# Patient Record
Sex: MALE | Race: OTHER | NOT HISPANIC OR LATINO
[De-identification: names, ages, dates, MRNs, and addresses within clinical notes are randomized per-mention and may not be internally consistent; named-entity substitution may affect disease eponyms.]

---

## 2017-04-27 ENCOUNTER — APPOINTMENT (OUTPATIENT)
Dept: PHYSICAL MEDICINE AND REHAB | Facility: CLINIC | Age: 72
End: 2017-04-27

## 2017-04-27 VITALS
SYSTOLIC BLOOD PRESSURE: 156 MMHG | HEIGHT: 70 IN | HEART RATE: 74 BPM | BODY MASS INDEX: 26.92 KG/M2 | WEIGHT: 188 LBS | DIASTOLIC BLOOD PRESSURE: 72 MMHG

## 2017-05-22 ENCOUNTER — APPOINTMENT (OUTPATIENT)
Dept: ORTHOPEDIC SURGERY | Facility: CLINIC | Age: 72
End: 2017-05-22

## 2017-05-22 VITALS
HEIGHT: 70 IN | WEIGHT: 186 LBS | DIASTOLIC BLOOD PRESSURE: 69 MMHG | HEART RATE: 81 BPM | SYSTOLIC BLOOD PRESSURE: 112 MMHG | BODY MASS INDEX: 26.63 KG/M2

## 2017-05-22 DIAGNOSIS — Z80.51 FAMILY HISTORY OF MALIGNANT NEOPLASM OF KIDNEY: ICD-10-CM

## 2017-05-22 DIAGNOSIS — Z82.61 FAMILY HISTORY OF ARTHRITIS: ICD-10-CM

## 2017-05-22 DIAGNOSIS — Z87.891 PERSONAL HISTORY OF NICOTINE DEPENDENCE: ICD-10-CM

## 2017-05-22 DIAGNOSIS — F17.200 NICOTINE DEPENDENCE, UNSPECIFIED, UNCOMPLICATED: ICD-10-CM

## 2017-05-22 RX ORDER — ACETYLCYSTEINE 600 MG
CAPSULE ORAL
Refills: 0 | Status: ACTIVE | COMMUNITY

## 2017-05-22 RX ORDER — HYDROCHLOROTHIAZIDE 25 MG/1
25 TABLET ORAL
Refills: 0 | Status: ACTIVE | COMMUNITY

## 2017-06-07 ENCOUNTER — APPOINTMENT (OUTPATIENT)
Dept: PHYSICAL MEDICINE AND REHAB | Facility: CLINIC | Age: 72
End: 2017-06-07

## 2017-06-07 VITALS — WEIGHT: 190 LBS | BODY MASS INDEX: 27.2 KG/M2 | HEIGHT: 70 IN

## 2017-06-07 VITALS
HEART RATE: 61 BPM | DIASTOLIC BLOOD PRESSURE: 73 MMHG | OXYGEN SATURATION: 96 % | TEMPERATURE: 97.8 F | SYSTOLIC BLOOD PRESSURE: 138 MMHG

## 2017-06-07 DIAGNOSIS — N40.0 BENIGN PROSTATIC HYPERPLASIA WITHOUT LOWER URINARY TRACT SYMPMS: ICD-10-CM

## 2017-06-08 PROBLEM — N40.0 ENLARGED PROSTATE: Status: RESOLVED | Noted: 2017-04-27 | Resolved: 2017-06-08

## 2017-07-24 ENCOUNTER — RX RENEWAL (OUTPATIENT)
Age: 72
End: 2017-07-24

## 2017-12-11 ENCOUNTER — OUTPATIENT (OUTPATIENT)
Dept: OUTPATIENT SERVICES | Facility: HOSPITAL | Age: 72
LOS: 1 days | End: 2017-12-11
Payer: COMMERCIAL

## 2017-12-11 VITALS
OXYGEN SATURATION: 97 % | DIASTOLIC BLOOD PRESSURE: 77 MMHG | RESPIRATION RATE: 16 BRPM | SYSTOLIC BLOOD PRESSURE: 163 MMHG | HEIGHT: 71 IN | HEART RATE: 58 BPM | WEIGHT: 179.9 LBS | TEMPERATURE: 98 F

## 2017-12-11 DIAGNOSIS — R94.39 ABNORMAL RESULT OF OTHER CARDIOVASCULAR FUNCTION STUDY: ICD-10-CM

## 2017-12-11 DIAGNOSIS — Z98.890 OTHER SPECIFIED POSTPROCEDURAL STATES: Chronic | ICD-10-CM

## 2017-12-11 LAB
ALBUMIN SERPL ELPH-MCNC: 3.9 G/DL — SIGNIFICANT CHANGE UP (ref 3.3–5)
ALP SERPL-CCNC: 72 U/L — SIGNIFICANT CHANGE UP (ref 40–120)
ALT FLD-CCNC: 17 U/L RC — SIGNIFICANT CHANGE UP (ref 10–45)
ANION GAP SERPL CALC-SCNC: 11 MMOL/L — SIGNIFICANT CHANGE UP (ref 5–17)
AST SERPL-CCNC: 24 U/L — SIGNIFICANT CHANGE UP (ref 10–40)
BILIRUB SERPL-MCNC: 0.6 MG/DL — SIGNIFICANT CHANGE UP (ref 0.2–1.2)
BUN SERPL-MCNC: 16 MG/DL — SIGNIFICANT CHANGE UP (ref 7–23)
CALCIUM SERPL-MCNC: 9 MG/DL — SIGNIFICANT CHANGE UP (ref 8.4–10.5)
CHLORIDE SERPL-SCNC: 105 MMOL/L — SIGNIFICANT CHANGE UP (ref 96–108)
CO2 SERPL-SCNC: 27 MMOL/L — SIGNIFICANT CHANGE UP (ref 22–31)
CREAT SERPL-MCNC: 0.96 MG/DL — SIGNIFICANT CHANGE UP (ref 0.5–1.3)
GLUCOSE SERPL-MCNC: 123 MG/DL — HIGH (ref 70–99)
HCT VFR BLD CALC: 37.4 % — LOW (ref 39–50)
HGB BLD-MCNC: 12.8 G/DL — LOW (ref 13–17)
MCHC RBC-ENTMCNC: 31.4 PG — SIGNIFICANT CHANGE UP (ref 27–34)
MCHC RBC-ENTMCNC: 34.2 GM/DL — SIGNIFICANT CHANGE UP (ref 32–36)
MCV RBC AUTO: 91.9 FL — SIGNIFICANT CHANGE UP (ref 80–100)
PLATELET # BLD AUTO: 266 K/UL — SIGNIFICANT CHANGE UP (ref 150–400)
POTASSIUM SERPL-MCNC: 4.1 MMOL/L — SIGNIFICANT CHANGE UP (ref 3.5–5.3)
POTASSIUM SERPL-SCNC: 4.1 MMOL/L — SIGNIFICANT CHANGE UP (ref 3.5–5.3)
PROT SERPL-MCNC: 6.9 G/DL — SIGNIFICANT CHANGE UP (ref 6–8.3)
RBC # BLD: 4.07 M/UL — LOW (ref 4.2–5.8)
RBC # FLD: 11.4 % — SIGNIFICANT CHANGE UP (ref 10.3–14.5)
SODIUM SERPL-SCNC: 143 MMOL/L — SIGNIFICANT CHANGE UP (ref 135–145)
WBC # BLD: 6.7 K/UL — SIGNIFICANT CHANGE UP (ref 3.8–10.5)
WBC # FLD AUTO: 6.7 K/UL — SIGNIFICANT CHANGE UP (ref 3.8–10.5)

## 2017-12-11 PROCEDURE — 93458 L HRT ARTERY/VENTRICLE ANGIO: CPT

## 2017-12-11 PROCEDURE — 85027 COMPLETE CBC AUTOMATED: CPT

## 2017-12-11 PROCEDURE — 93458 L HRT ARTERY/VENTRICLE ANGIO: CPT | Mod: 26

## 2017-12-11 PROCEDURE — C1769: CPT

## 2017-12-11 PROCEDURE — C1887: CPT

## 2017-12-11 PROCEDURE — C1894: CPT

## 2017-12-11 PROCEDURE — 80053 COMPREHEN METABOLIC PANEL: CPT

## 2017-12-11 RX ORDER — TAMSULOSIN HYDROCHLORIDE 0.4 MG/1
1 CAPSULE ORAL
Qty: 0 | Refills: 0 | COMMUNITY

## 2017-12-11 RX ORDER — MULTIVIT-MIN/FERROUS GLUCONATE 9 MG/15 ML
1 LIQUID (ML) ORAL
Qty: 0 | Refills: 0 | COMMUNITY

## 2017-12-11 RX ORDER — UBIDECARENONE 100 MG
1 CAPSULE ORAL
Qty: 0 | Refills: 0 | COMMUNITY

## 2017-12-11 RX ORDER — SODIUM CHLORIDE 9 MG/ML
3 INJECTION INTRAMUSCULAR; INTRAVENOUS; SUBCUTANEOUS EVERY 8 HOURS
Qty: 0 | Refills: 0 | Status: DISCONTINUED | OUTPATIENT
Start: 2017-12-11 | End: 2017-12-26

## 2017-12-11 RX ORDER — RANOLAZINE 500 MG/1
500 TABLET, FILM COATED, EXTENDED RELEASE ORAL ONCE
Qty: 0 | Refills: 0 | Status: COMPLETED | OUTPATIENT
Start: 2017-12-11 | End: 2017-12-11

## 2017-12-11 RX ORDER — ASPIRIN/CALCIUM CARB/MAGNESIUM 324 MG
1 TABLET ORAL
Qty: 0 | Refills: 0 | COMMUNITY

## 2017-12-11 RX ORDER — CHOLECALCIFEROL (VITAMIN D3) 125 MCG
1 CAPSULE ORAL
Qty: 0 | Refills: 0 | COMMUNITY

## 2017-12-11 RX ORDER — PERINDOPRIL ERBUMINE 8 MG/1
1 TABLET ORAL
Qty: 0 | Refills: 0 | COMMUNITY

## 2017-12-11 RX ORDER — GLUCOSAMINE HCL/CHONDROITIN SU 500-400 MG
1 CAPSULE ORAL
Qty: 0 | Refills: 0 | COMMUNITY

## 2017-12-11 RX ORDER — ATENOLOL 25 MG/1
2 TABLET ORAL
Qty: 0 | Refills: 0 | COMMUNITY

## 2017-12-11 RX ORDER — ROSUVASTATIN CALCIUM 5 MG/1
1 TABLET ORAL
Qty: 0 | Refills: 0 | COMMUNITY

## 2017-12-11 RX ADMIN — RANOLAZINE 500 MILLIGRAM(S): 500 TABLET, FILM COATED, EXTENDED RELEASE ORAL at 11:12

## 2017-12-11 NOTE — H&P CARDIOLOGY - HISTORY OF PRESENT ILLNESS
72 year old Afghani male with PMH of former smoker, htn, hld, presents today for cardiac catheterization. Patient reports his old cardiologist  so went to new cardiologist who ordered NST revealing   Patient denies CP or DE JESUS. 72 year old Afghani male with PMH of former smoker, htn, hld, presents today for cardiac catheterization. Patient reports his PCP/cardiologist passed away so went to new cardiologist (Dr Sheik Downey)  who ordered NST revealing abnormality (report not available). Patient denies CP or DE JESUS.   On BB at home. Ranexa added for maximal anti anginal support

## 2022-08-11 ENCOUNTER — NON-APPOINTMENT (OUTPATIENT)
Age: 77
End: 2022-08-11

## 2022-08-21 ENCOUNTER — NON-APPOINTMENT (OUTPATIENT)
Age: 77
End: 2022-08-21

## 2022-12-06 ENCOUNTER — OFFICE (OUTPATIENT)
Dept: URBAN - METROPOLITAN AREA CLINIC 109 | Facility: CLINIC | Age: 77
Setting detail: OPHTHALMOLOGY
End: 2022-12-06
Payer: COMMERCIAL

## 2022-12-06 DIAGNOSIS — H26.491: ICD-10-CM

## 2022-12-06 DIAGNOSIS — H35.3111: ICD-10-CM

## 2022-12-06 DIAGNOSIS — H35.3121: ICD-10-CM

## 2022-12-06 PROBLEM — I65.23 OCCLUSION AND STENOSIS OF BOTH CAROTID ARTERIES: Status: ACTIVE | Noted: 2022-12-06

## 2022-12-06 PROCEDURE — 92134 CPTRZ OPH DX IMG PST SGM RTA: CPT | Performed by: OPHTHALMOLOGY

## 2022-12-06 PROCEDURE — 92014 COMPRE OPH EXAM EST PT 1/>: CPT | Performed by: OPHTHALMOLOGY

## 2022-12-06 ASSESSMENT — REFRACTION_CURRENTRX
OS_SPHERE: +1.50
OS_AXIS: 68
OD_OVR_VA: 20/
OS_OVR_VA: 20/
OS_CYLINDER: -1.00
OD_SPHERE: +2.00
OD_AXIS: 92
OD_ADD: +2.75
OS_ADD: +2.75
OD_CYLINDER: -0.75

## 2022-12-06 ASSESSMENT — KERATOMETRY
OD_K1POWER_DIOPTERS: 44.00
OS_K2POWER_DIOPTERS: 45.25
OD_K2POWER_DIOPTERS: 44.87
OS_K1POWER_DIOPTERS: 44.00
OD_AXISANGLE_DEGREES: 172
OS_AXISANGLE_DEGREES: 1

## 2022-12-06 ASSESSMENT — SPHEQUIV_DERIVED
OD_SPHEQUIV: 0.5
OS_SPHEQUIV: -0.125

## 2022-12-06 ASSESSMENT — REFRACTION_AUTOREFRACTION
OD_AXIS: 109
OS_CYLINDER: -1.75
OS_AXIS: 070
OD_CYLINDER: -1.50
OD_SPHERE: +1.25
OS_SPHERE: +0.75

## 2022-12-06 ASSESSMENT — VISUAL ACUITY
OD_BCVA: 20/20-2
OS_BCVA: 20/20-2

## 2022-12-06 ASSESSMENT — AXIALLENGTH_DERIVED
OS_AL: 23.2329
OD_AL: 23.0657

## 2022-12-06 ASSESSMENT — TONOMETRY
OS_IOP_MMHG: 16
OD_IOP_MMHG: 10

## 2022-12-06 ASSESSMENT — PACHYMETRY
OS_CT_CORRECTION: 1
OS_CT_UM: 537

## 2022-12-06 ASSESSMENT — REFRACTION_MANIFEST
OS_ADD: +2.50
OD_ADD: +2.50

## 2023-06-02 PROBLEM — I10 ESSENTIAL (PRIMARY) HYPERTENSION: Chronic | Status: ACTIVE | Noted: 2017-12-11

## 2023-06-02 PROBLEM — Z87.891 PERSONAL HISTORY OF NICOTINE DEPENDENCE: Chronic | Status: ACTIVE | Noted: 2017-12-11

## 2023-06-02 PROBLEM — E78.5 HYPERLIPIDEMIA, UNSPECIFIED: Chronic | Status: ACTIVE | Noted: 2017-12-11

## 2023-06-05 ENCOUNTER — APPOINTMENT (OUTPATIENT)
Dept: ORTHOPEDIC SURGERY | Facility: CLINIC | Age: 78
End: 2023-06-05
Payer: COMMERCIAL

## 2023-06-05 PROCEDURE — 72110 X-RAY EXAM L-2 SPINE 4/>VWS: CPT

## 2023-06-05 PROCEDURE — 99204 OFFICE O/P NEW MOD 45 MIN: CPT

## 2023-06-05 NOTE — HISTORY OF PRESENT ILLNESS
[Lower back] : lower back [Gradual] : gradual [Sudden] : sudden [7] : 7 [6] : 6 [Burning] : burning [Shooting] : shooting [Stabbing] : stabbing [Intermittent] : intermittent [Rest] : rest [Exercising] : exercising [Retired] : Work status: retired [de-identified] : Patient presents today with complaints of low back pain that radiates to RLE (lateral calf) x 2 weeks. Patient reports symptoms worsen with activity but remains active. Patient denies precipitating event/acute injury. Patient denies n/t.  Patient reports these symptoms have been present since 2010 in which he underwent Lumbar laminectomy. His symptoms have returned and he reports having persistent loss of balance. Over the years, he has completed course of PT.   States major issue with balance as well even when standing still. 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   L4-5 posterior surgical changes with grade 1 anterolisthesis, circumferential disc bulge and facet arthropathy contributing to severe central canal and bilateral neural foraminal stenosis. These findings are stable.\par \par 2.  Additional findings at this level include fairly extensive marrow edema along the endplates at L4-L5 with mild signal hyperintensity involving the intervening L4-5 disc. Differential diagnosis includes advanced degenerative change versus discitis osteomyelitis. Clinical correlation suggested with ESR. Contrast may be helpful in the complete evaluation this patient.\par \par Lumbar Xrays 4V completed today:\par L4/5, L5/S1 disc degeneration [] : Post Surgical Visit: no [FreeTextEntry1] : l spine  [FreeTextEntry5] : Pt has a hx of spinal stenosis, pt is having a sharp pain on the right side of his lower back as well as numbness and tingling and loss of balance in his right leg for the past few months and has seen outside drs, pt had an mri done and was told to follow up with us for further eval, pt has had L4-L5 surgery in 2010 but does not remember what the procedure was   [de-identified] : none  [de-identified] : 5+ years ago

## 2023-06-05 NOTE — ASSESSMENT
[FreeTextEntry1] : 77 M with known history of lumbar spinal stenosis and spondylolisthesis with 2 weeks of RLE radic and 5 years of balance issues. \par posiutive hoffmans\par never worked up for myelopathy\par MRI C/T/L spine\par FU after MRI\par MDP

## 2023-06-14 ENCOUNTER — FORM ENCOUNTER (OUTPATIENT)
Age: 78
End: 2023-06-14

## 2023-06-15 ENCOUNTER — APPOINTMENT (OUTPATIENT)
Dept: MRI IMAGING | Facility: CLINIC | Age: 78
End: 2023-06-15
Payer: COMMERCIAL

## 2023-06-15 PROCEDURE — 72148 MRI LUMBAR SPINE W/O DYE: CPT

## 2023-06-15 PROCEDURE — 72141 MRI NECK SPINE W/O DYE: CPT

## 2023-06-18 ENCOUNTER — FORM ENCOUNTER (OUTPATIENT)
Age: 78
End: 2023-06-18

## 2023-06-19 ENCOUNTER — APPOINTMENT (OUTPATIENT)
Dept: MRI IMAGING | Facility: CLINIC | Age: 78
End: 2023-06-19
Payer: COMMERCIAL

## 2023-06-19 ENCOUNTER — FORM ENCOUNTER (OUTPATIENT)
Age: 78
End: 2023-06-19

## 2023-06-19 PROCEDURE — 70553 MRI BRAIN STEM W/O & W/DYE: CPT

## 2023-06-20 ENCOUNTER — APPOINTMENT (OUTPATIENT)
Dept: MRI IMAGING | Facility: CLINIC | Age: 78
End: 2023-06-20
Payer: COMMERCIAL

## 2023-06-20 PROCEDURE — 72146 MRI CHEST SPINE W/O DYE: CPT

## 2023-06-21 ENCOUNTER — TRANSCRIPTION ENCOUNTER (OUTPATIENT)
Age: 78
End: 2023-06-21

## 2023-06-22 ENCOUNTER — NON-APPOINTMENT (OUTPATIENT)
Age: 78
End: 2023-06-22

## 2023-06-26 ENCOUNTER — APPOINTMENT (OUTPATIENT)
Dept: ORTHOPEDIC SURGERY | Facility: CLINIC | Age: 78
End: 2023-06-26
Payer: COMMERCIAL

## 2023-06-26 VITALS — HEIGHT: 70 IN | WEIGHT: 190 LBS | BODY MASS INDEX: 27.2 KG/M2

## 2023-06-26 PROCEDURE — 99213 OFFICE O/P EST LOW 20 MIN: CPT

## 2023-06-26 NOTE — HISTORY OF PRESENT ILLNESS
[Neck] : neck [Mid-back] : mid-back [Lower back] : lower back [Gradual] : gradual [Sudden] : sudden [7] : 7 [6] : 6 [Burning] : burning [Shooting] : shooting [Stabbing] : stabbing [Intermittent] : intermittent [Rest] : rest [Exercising] : exercising [Retired] : Work status: retired [de-identified] : 6/26/23: MRI results .  Still wit shuffling gait.  Issues with ambulating. No fine motor movement or dexterity issues. NO pain radiating down arms. No issues with bowel or bladder symptoms. \par \par 6/5/23: Patient presents today with complaints of low back pain that radiates to RLE (lateral calf) x 2 weeks. Patient reports symptoms worsen with activity but remains active. Patient denies precipitating event/acute injury. Patient denies n/t.  Patient reports these symptoms have been present since 2010 in which he underwent Lumbar laminectomy. His symptoms have returned and he reports having persistent loss of balance. Over the years, he has completed course of PT.   States major issue with balance as well even when standing still. 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   L4-5 posterior surgical changes with grade 1 anterolisthesis, circumferential disc bulge and facet arthropathy contributing to severe central canal and bilateral neural foraminal stenosis. These findings are stable.\par \par 2.  Additional findings at this level include fairly extensive marrow edema along the endplates at L4-L5 with mild signal hyperintensity involving the intervening L4-5 disc. Differential diagnosis includes advanced degenerative change versus discitis osteomyelitis. Clinical correlation suggested with ESR. Contrast may be helpful in the complete evaluation this patient.\par \par Lumbar Xrays 4V \par L4/5, L5/S1 disc degeneration [] : Post Surgical Visit: no [de-identified] : MRI [de-identified] : 5+ years ago

## 2023-06-26 NOTE — ASSESSMENT
[FreeTextEntry1] : 77 M with known history of lumbar spinal stenosis and spondylolisthesis with RLE radic and balance issues\par Incidental finding on c spine MRI of meningioma. \par MRI brain was ordered and he has gonzalez with Dr. Zhu on 6/28 \par Has cord compression in cervical spine. no cord signal changes.\par T spine mri with degenerative changes no cord compression\par L spine MRI with degenerative changes and foraminal stenosis that account of this lumbar radiculopathy\par Discussed with patient that he will need a posterior cervical decompression however should be evaluate dby neurosurgery first to see if his symptoms may be coming from the meningioma\par FU 4 weeks

## 2023-06-26 NOTE — IMAGING
[de-identified] : MRI Cervical spine reviewed and interpreted independently.  Agree with report as follows.\par 1. Mass lesion in the region of the right occipital lobe of the brain partially imaged measuring approximately 3.5 \par cm resulting in mass effect and there may be internal calcifications but there is no significant surrounding \par edema or swelling raising the possibility of a large meningioma but this cannot be confirmed on the current \par exam. Recommend MRI of the brain with intravenous contrast material to further evaluate.\par 2. Mild scoliosis, slight reversal of the cervical lordosis and multilevel degenerative disc disease.\par 3. Mild cord compression asymmetric on the right at C4-C5 and C5-C6, cord impingement at C6-C7 and \par multilevel foraminal narrowing and exiting nerve root impingement most severe on the right in the mid-tolower cervical spine and on the left at C3-C4 with significant multilevel foraminal narrowing.\par 4. Scattered degenerative endplate changes without acute fracture.\par \par MRI Thoracic spine reviewed and interpreted independently.  Agree with report as follows.\par Multilevel thoracic degenerative disc disease.\par Disc herniations at T7-8 at T10-11 without compression of the spinal cord.\par Disc herniation with enlargement and probably calcification of the ligamentum flavum at T11-12 with minimal \par compression of the spinal cord.\par The spinal cord has a normal signal.\par \par MRI Lumbar spine reviewed and interpreted independently.  Agree with report as follows. \par 1. Exaggerated lumbar lordosis, anterolisthesis at L4-L5, multilevel degenerative disc disease, multiple disc herniations, \par multilevel central stenosis most severe at L3-L4 and L4-L5 and prior decompression posteriorly with laminectomy at L4-\par L5 with findings consistent with arachnoiditis at the L3-L4 and L4-L5 levels as well as multilevel exiting nerve root \par impingement with degenerative endplate and reactive marrow signal changes most severe at L4-L5 without acute fracture \par or discitis.\par 2. Cord impingement and bilateral exiting nerve root impingement at T11-T12 in the visualized lower thoracic spine.\par 3. There are findings suggesting multiple bilateral renal cysts right greater than left incompletely evaluated on the current \par exam. Consider ultrasound of the kidneys to further evaluate as clinically indicated.\par 4. Clinical correlation regarding prior surgical history is recommended.\par \par \par MRI brain with and without contrast\par Meningioma based on the right side of the falx adjacent to the right occipital lobe with moderate compression of \par the brain but without gliosis or edema. Please see discussion above for complete anatomic description.\par Mild white matter microvascular disease.

## 2023-06-28 ENCOUNTER — APPOINTMENT (OUTPATIENT)
Dept: NEUROSURGERY | Facility: CLINIC | Age: 78
End: 2023-06-28
Payer: COMMERCIAL

## 2023-06-28 ENCOUNTER — NON-APPOINTMENT (OUTPATIENT)
Age: 78
End: 2023-06-28

## 2023-06-28 DIAGNOSIS — G93.89 OTHER SPECIFIED DISORDERS OF BRAIN: ICD-10-CM

## 2023-06-28 PROCEDURE — 99203 OFFICE O/P NEW LOW 30 MIN: CPT

## 2023-06-29 PROBLEM — G93.89 BRAIN MASS: Status: ACTIVE | Noted: 2023-06-16

## 2023-06-29 NOTE — ASSESSMENT
[FreeTextEntry1] : Discussion\par Repeat MRI w/o contrast in one year\par Brain mass has likely been there for 20+ years \par symptoms are not related to meningioma \par f/u with ortho\par \par .[facility] evaluated the patient with the nurse practitioner Joann Barthelemy and established the plan of care. I personally discuss this patient with the nurse practitioner at the time of the visit. I agree with the assessment and plan as written, unless noted below.

## 2023-06-29 NOTE — HISTORY OF PRESENT ILLNESS
[FreeTextEntry1] : newly found brain mass  [de-identified] : 77 year old right handed male with a past medical history of HTN, HLD, and osteoarthritis reports balance impairment seen by orthopedists Huang Gaytan  cervical , thoracic, and lumbar spine MRI ordered. C-spine MRI revealed a mass in the region of the right occipital love of the brain partially imaged measuring approximately 3.5  cm. A brain MRI w/wo contrast was ordered. Today he is here to discuss the findings. \par \par He reports continued balance difficulties denies falls, denies visual impairment, weakness of extremities, and altered sensation. \par \par Neuro Exam: + Romberg, otherwise intact.

## 2023-06-29 NOTE — REVIEW OF SYSTEMS
[Poor Coordination] : poor coordination [Difficulty Walking] : difficulty walking [As Noted in HPI] : as noted in HPI [Negative] : Eyes [de-identified] : uses walker for long distances

## 2023-06-29 NOTE — PHYSICAL EXAM
[General Appearance - Alert] : alert [General Appearance - In No Acute Distress] : in no acute distress [General Appearance - Well Nourished] : well nourished [Oriented To Time, Place, And Person] : oriented to person, place, and time [Person] : oriented to person [Place] : oriented to place [Time] : oriented to time [Short Term Intact] : short term memory intact [Remote Intact] : remote memory intact [Registration Intact] : recent registration memory intact [Motor Strength] : muscle strength was normal in all four extremities [Romberg's Sign] : a positive Romberg's sign was present [2+] : Ankle jerk left 2+ [Sclera] : the sclera and conjunctiva were normal [PERRL With Normal Accommodation] : pupils were equal in size, round, reactive to light, with normal accommodation [Extraocular Movements] : extraocular movements were intact [Outer Ear] : the ears and nose were normal in appearance [] : no respiratory distress [Involuntary Movements] : no involuntary movements were seen [Motor Tone] : muscle strength and tone were normal [Skin Color & Pigmentation] : normal skin color and pigmentation

## 2023-07-20 ENCOUNTER — APPOINTMENT (OUTPATIENT)
Dept: ORTHOPEDIC SURGERY | Facility: CLINIC | Age: 78
End: 2023-07-20
Payer: COMMERCIAL

## 2023-07-20 VITALS — HEIGHT: 70 IN | BODY MASS INDEX: 27.2 KG/M2 | WEIGHT: 190 LBS

## 2023-07-20 DIAGNOSIS — G95.9 DISEASE OF SPINAL CORD, UNSPECIFIED: ICD-10-CM

## 2023-07-20 PROCEDURE — 99214 OFFICE O/P EST MOD 30 MIN: CPT

## 2023-07-20 NOTE — IMAGING
[de-identified] : MRI Cervical spine reviewed and interpreted independently.  Agree with report as follows.\par 1. Mass lesion in the region of the right occipital lobe of the brain partially imaged measuring approximately 3.5 \par cm resulting in mass effect and there may be internal calcifications but there is no significant surrounding \par edema or swelling raising the possibility of a large meningioma but this cannot be confirmed on the current \par exam. Recommend MRI of the brain with intravenous contrast material to further evaluate.\par 2. Mild scoliosis, slight reversal of the cervical lordosis and multilevel degenerative disc disease.\par 3. Mild cord compression asymmetric on the right at C4-C5 and C5-C6, cord impingement at C6-C7 and \par multilevel foraminal narrowing and exiting nerve root impingement most severe on the right in the mid-tolower cervical spine and on the left at C3-C4 with significant multilevel foraminal narrowing.\par 4. Scattered degenerative endplate changes without acute fracture.\par \par MRI Thoracic spine reviewed and interpreted independently.  Agree with report as follows.\par Multilevel thoracic degenerative disc disease.\par Disc herniations at T7-8 at T10-11 without compression of the spinal cord.\par Disc herniation with enlargement and probably calcification of the ligamentum flavum at T11-12 with minimal \par compression of the spinal cord.\par The spinal cord has a normal signal.\par \par MRI Lumbar spine reviewed and interpreted independently.  Agree with report as follows. \par 1. Exaggerated lumbar lordosis, anterolisthesis at L4-L5, multilevel degenerative disc disease, multiple disc herniations, \par multilevel central stenosis most severe at L3-L4 and L4-L5 and prior decompression posteriorly with laminectomy at L4-\par L5 with findings consistent with arachnoiditis at the L3-L4 and L4-L5 levels as well as multilevel exiting nerve root \par impingement with degenerative endplate and reactive marrow signal changes most severe at L4-L5 without acute fracture \par or discitis.\par 2. Cord impingement and bilateral exiting nerve root impingement at T11-T12 in the visualized lower thoracic spine.\par 3. There are findings suggesting multiple bilateral renal cysts right greater than left incompletely evaluated on the current \par exam. Consider ultrasound of the kidneys to further evaluate as clinically indicated.\par 4. Clinical correlation regarding prior surgical history is recommended.\par \par \par MRI brain with and without contrast\par Meningioma based on the right side of the falx adjacent to the right occipital lobe with moderate compression of \par the brain but without gliosis or edema. Please see discussion above for complete anatomic description.\par Mild white matter microvascular disease.

## 2023-07-20 NOTE — HISTORY OF PRESENT ILLNESS
[Neck] : neck [Mid-back] : mid-back [Lower back] : lower back [Gradual] : gradual [Sudden] : sudden [7] : 7 [6] : 6 [Burning] : burning [Shooting] : shooting [Stabbing] : stabbing [Intermittent] : intermittent [Rest] : rest [Exercising] : exercising [Retired] : Work status: retired [de-identified] : 7/20/23  Saw neurosurgery.  Symtoms not from meningioma.  Also thought that not from cervical spine. Smtpoms are same.  No neck pain ro radiating arm pain.  Has pain radiating from low back down legs. \par \par 6/26/23: MRI results .  Still wit shuffling gait.  Issues with ambulating. No fine motor movement or dexterity issues. NO pain radiating down arms. No issues with bowel or bladder symptoms. \par \par 6/5/23: Patient presents today with complaints of low back pain that radiates to RLE (lateral calf) x 2 weeks. Patient reports symptoms worsen with activity but remains active. Patient denies precipitating event/acute injury. Patient denies n/t.  Patient reports these symptoms have been present since 2010 in which he underwent Lumbar laminectomy. His symptoms have returned and he reports having persistent loss of balance. Over the years, he has completed course of PT.   States major issue with balance as well even when standing still. 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   L4-5 posterior surgical changes with grade 1 anterolisthesis, circumferential disc bulge and facet arthropathy contributing to severe central canal and bilateral neural foraminal stenosis. These findings are stable.\par \par 2.  Additional findings at this level include fairly extensive marrow edema along the endplates at L4-L5 with mild signal hyperintensity involving the intervening L4-5 disc. Differential diagnosis includes advanced degenerative change versus discitis osteomyelitis. Clinical correlation suggested with ESR. Contrast may be helpful in the complete evaluation this patient.\par \par Lumbar Xrays 4V \par L4/5, L5/S1 disc degeneration [] : Post Surgical Visit: no [de-identified] : MRI [de-identified] : 5+ years ago

## 2023-07-20 NOTE — ASSESSMENT
[FreeTextEntry1] : 77 M with known history of lumbar spinal stenosis and spondylolisthesis with RLE radic and balance issues\par Discussed that I believe his balance issues are coming from cervical cord compression. Discussed posterior cervical laminectomy and fusion.  He is unsure if he wants to pursue surgical intervention.\par \par He will seek out second opinion for cervical decompression\par regarding lumbar stenosis and radicular pain he should  see pain management for Lumbar SOFY\par \par FU 4 weeks

## 2023-08-01 ENCOUNTER — APPOINTMENT (OUTPATIENT)
Dept: PAIN MANAGEMENT | Facility: CLINIC | Age: 78
End: 2023-08-01
Payer: COMMERCIAL

## 2023-08-01 VITALS — BODY MASS INDEX: 27.2 KG/M2 | WEIGHT: 190 LBS | HEIGHT: 70 IN

## 2023-08-01 PROCEDURE — 99214 OFFICE O/P EST MOD 30 MIN: CPT

## 2023-08-01 NOTE — DISCUSSION/SUMMARY
[de-identified] : After discussing various treatment options with the patient including but not limited to oral medications, physical therapy, exercise modalities as well as interventional spinal injections, we have decided with the following plan:  - Continue Home exercises, stretching, activity modification, physical therapy, and conservative care. - MRI report and/or images was reviewed and discussed with the patient. - Recommend Right L5-S1 & S1 Transforaminal Epidural Steroid Injection under fluoroscopic guidance with image. - The risks, benefits and alternatives of the proposed procedure were explained in detail with the patient. The risks outlined include but are not limited to infection, bleeding, post-dural puncture headache, nerve injury, a temporary increase in pain, failure to resolve symptoms, allergic reaction, symptom recurrence, and possible elevation of blood sugar in diabetics. All questions were answered to patient's apparent satisfaction and he/she verbalized an understanding. - Patient is presenting with acute/sub-acute radicular pain with impairment in ADLs and functionality.  The pain has not responded to conservative care including NSAID therapy and/or physical therapy.  There is no bleeding tendency, unstable medical condition, or systemic infection. - Follow up in 1-2 weeks post injection for re-evaluation. *pt takes 81mg Aspirin  - If no relief can also consider L3-4 LESI, however worst pain is the right calf so would try Right TFESI first.  - Patient advised to follow-up with primary care physician / nephrologist for evaluation of renal lesion/cyst(s) found on MRI.

## 2023-08-01 NOTE — PHYSICAL EXAM
[de-identified] : Constitutional; Appears well, no apparent distress Ability to communicate: Normal  Respiratory: non-labored breathing Skin: No rash noted Head: Normocephalic, atraumatic Neck: no visible thyroid enlargement Eyes: Extraocular movements intact Neurologic: Alert and oriented x3 Psychiatric: normal mood, affect and behavior [] : light touch intact throughout both lower extremities

## 2023-08-01 NOTE — HISTORY OF PRESENT ILLNESS
[FreeTextEntry1] : Initial HPI 08/01/2023:  Pain started a few months ago and has no lower back pain but it radiates down the RIGHT buttock and down the right thigh and lower leg to the calf described as a dull pressure pain. Saw Dr. Rambo Gaytan who recommended LESI.   MRI Lumbar Spine 6/15/23 independently reviewed: multilevel DDD and HNPs worse at L3-4, L4-5 with arachnoiditis; L5-S1 HNP with R>L L5 impingement.   Conservative Care: PT Pain Medications: meloxicam (little relief)  Past Injections: many years ago  Spine surgery: L4-5 laminectomy 2010  Blood thinners:  *pt takes 81mg Aspirin. [7] : 7 [2] : 2 [Dull/Aching] : dull/aching [Radiating] : radiating [Tingling] : tingling [Intermittent] : intermittent [Walking] : walking [] : yes [FreeTextEntry6] : numbness [FreeTextEntry7] : right buttock leg to the calf [de-identified] : 10 years  [de-identified] : 10 years

## 2023-08-08 ENCOUNTER — APPOINTMENT (OUTPATIENT)
Dept: PAIN MANAGEMENT | Facility: CLINIC | Age: 78
End: 2023-08-08
Payer: COMMERCIAL

## 2023-08-08 PROCEDURE — 64483 NJX AA&/STRD TFRM EPI L/S 1: CPT | Mod: RT

## 2023-08-08 PROCEDURE — 64484 NJX AA&/STRD TFRM EPI L/S EA: CPT | Mod: RT,59

## 2023-08-08 PROCEDURE — J3490M: CUSTOM

## 2023-08-08 NOTE — PROCEDURE
[FreeTextEntry3] : Date of Service: 08/08/2023   Account: [id number]  Patient: CARMEN BAGLEY   YOB: 1945  Age: 77 year  Surgeon:                                                          Spencer Gaytan D.O.  Assistant:                                                         None  Pre-Operative Diagnosis:                             Lumbosacral radiculitis (M54.17)  Post-Operative Diagnosis:                           Same  Procedure:                                                      Right L5-S1, S1 transforaminal epidural steroid injection under fluoroscopic guidance.  Anesthesia:                                                     Local with MAC   This procedure was carried out using fluoroscopic guidance.  The risks and benefits of the procedure were discussed extensively with the patient.  The consent of the patient was obtained and the following procedure was performed. The patient was placed in the prone position on the fluoroscopy table and the area was prepped and draped in a sterile fashion.  A timeout was performed with all essential staff present and the site and side were verified.  The right L5-S1 neural foramen was then identified on right oblique "abimbola dog" anatomical view at the 6 o'clock position using fluoroscopic guidance, and the area was marked. The overlying skin and subcutaneous structures were anesthetized using sterile technique with 1% Lidocaine.   A 22-gauge 3.5 inch spinal needle was directed toward the inferior (6 o'clock) position of the pedicle, which formed the roof of the identified foramen.  Once in the epidural space, after negative aspiration for heme and CSF, 1 cc of Omnipaque contrast was injected under live fluoroscopy to confirm epidural location and assess filling defects and rule out intravascular needle placement.  Lumbar epidurogram showed no intravascular or intrathecal flow pattern.  No blood or CSF was aspirated. Contrast spread medially in epidural space and outlined the exiting nerve root.  After this, an injectate of 1 cc preservative free normal saline plus 6 mg of betamethasone and 1 cc of 0.25% bupivacaine was injected in the epidural space.  The right S1 neural foramen was then identified on right oblique anatomical view at the 6 o'clock position of the S1 pedicle using fluoroscopic guidance, and the area was marked. The overlying skin and subcutaneous structures were anesthetized using sterile technique with 1% Lidocaine.  A 22-gauge 3.5 inch spinal needle was directed toward the inferior (6 o'clock) position of the pedicle, which formed the roof of the identified foramen.  Once in the epidural space, after negative aspiration for heme and CSF, 1 cc of Omnipaque contrast was injected under live fluoroscopy to confirm epidural location and assess filling defects and rule out intravascular needle placement.   The following contrast flow and epidurogram was observed: no intravascular or intrathecal flow pattern was noted.  No blood or CSF was aspirated. Contrast spread appeared to outline the right S1 nerve root and spread medially into the epidural space.    After this, an injectate of 1 cc preservative free normal saline plus 6 mg of betamethasone and 1 cc of 0.25% bupivacaine was injected in the epidural space.  The needle was subsequently removed.  Vital signs remained normal.  Pulse oximeter was used throughout the procedure and the patient's pulse and oxygen saturation remained within normal limits.  The patient tolerated the procedure well.  There were no complications.  The patient was instructed to apply ice over the injection sites for twenty minutes every two hours for the next 24 to 48 hours.  The patient was also instructed to contact me immediately if there were any problems.  Spencer Gaytan D.O.

## 2023-08-22 ENCOUNTER — APPOINTMENT (OUTPATIENT)
Dept: PAIN MANAGEMENT | Facility: CLINIC | Age: 78
End: 2023-08-22
Payer: COMMERCIAL

## 2023-08-22 ENCOUNTER — RX RENEWAL (OUTPATIENT)
Age: 78
End: 2023-08-22

## 2023-08-22 VITALS — WEIGHT: 190 LBS | BODY MASS INDEX: 27.2 KG/M2 | HEIGHT: 70 IN

## 2023-08-22 DIAGNOSIS — M43.16 SPONDYLOLISTHESIS, LUMBAR REGION: ICD-10-CM

## 2023-08-22 PROCEDURE — 99214 OFFICE O/P EST MOD 30 MIN: CPT

## 2023-08-22 NOTE — HISTORY OF PRESENT ILLNESS
[FreeTextEntry1] : 08/22/2023: s/p Right L5-S1 & S1 TFESI on 8/8/23 with >50% relief and improvement of ADLs. Pain was great for 2-3 days and then started to return. Pain is worse i nteh right buttock and right calf and worse in the mornings and with walking.   Initial HPI 08/01/2023:  Pain started a few months ago and has no lower back pain but it radiates down the RIGHT buttock and down the right thigh and lower leg to the calf described as a dull pressure pain. Saw Dr. Rambo Gaytan who recommended LESI.   MRI Lumbar Spine 6/15/23 independently reviewed: multilevel DDD and HNPs worse at L3-4, L4-5 with arachnoiditis; L5-S1 HNP with R>L L5 impingement.   Conservative Care: PT Pain Medications: meloxicam (little relief)  Past Injections: many years ago  Spine surgery: L4-5 laminectomy 2010  Blood thinners:  *pt takes 81mg Aspirin. [8] : 8 [5] : 5 [Dull/Aching] : dull/aching [Radiating] : radiating [Tingling] : tingling [Intermittent] : intermittent [Walking] : walking [] : yes [FreeTextEntry6] : numbness [FreeTextEntry7] : right buttock leg to the calf and foot [de-identified] : 10 years  [de-identified] : 10 years

## 2023-08-22 NOTE — DISCUSSION/SUMMARY
[de-identified] : After discussing various treatment options with the patient including but not limited to oral medications, physical therapy, exercise modalities as well as interventional spinal injections, we have decided with the following plan:  - Continue Home exercises, stretching, activity modification, physical therapy, and conservative care. - MRI report and/or images was reviewed and discussed with the patient. - Recommend L5-S1 Lumbar Epidural Steroid Injection under fluoroscopic guidance with image. (RIGHT)  - The risks, benefits and alternatives of the proposed procedure were explained in detail with the patient. The risks outlined include but are not limited to infection, bleeding, post-dural puncture headache, nerve injury, a temporary increase in pain, failure to resolve symptoms, allergic reaction, symptom recurrence, and possible elevation of blood sugar in diabetics. All questions were answered to patient's apparent satisfaction and he/she verbalized an understanding. - Patient is presenting with acute/sub-acute radicular pain with impairment in ADLs and functionality.  The pain has not responded to conservative care including NSAID therapy and/or physical therapy.  There is no bleeding tendency, unstable medical condition, or systemic infection. - Follow up in 1-2 weeks post injection for re-evaluation. *pt takes 81mg Aspirin

## 2023-08-22 NOTE — PHYSICAL EXAM
[de-identified] : Constitutional; Appears well, no apparent distress Ability to communicate: Normal  Respiratory: non-labored breathing Skin: No rash noted Head: Normocephalic, atraumatic Neck: no visible thyroid enlargement Eyes: Extraocular movements intact Neurologic: Alert and oriented x3 Psychiatric: normal mood, affect and behavior [] : light touch intact throughout both lower extremities

## 2023-09-12 ENCOUNTER — APPOINTMENT (OUTPATIENT)
Dept: PAIN MANAGEMENT | Facility: CLINIC | Age: 78
End: 2023-09-12
Payer: COMMERCIAL

## 2023-09-12 PROCEDURE — 62323 NJX INTERLAMINAR LMBR/SAC: CPT

## 2023-09-26 ENCOUNTER — APPOINTMENT (OUTPATIENT)
Dept: PAIN MANAGEMENT | Facility: CLINIC | Age: 78
End: 2023-09-26
Payer: COMMERCIAL

## 2023-09-26 VITALS — HEIGHT: 70 IN | WEIGHT: 190 LBS | BODY MASS INDEX: 27.2 KG/M2

## 2023-09-26 DIAGNOSIS — M48.07 SPINAL STENOSIS, LUMBOSACRAL REGION: ICD-10-CM

## 2023-09-26 DIAGNOSIS — M47.817 SPONDYLOSIS W/OUT MYELOPATHY OR RADICULOPATHY, LUMBOSACRAL REGION: ICD-10-CM

## 2023-09-26 PROCEDURE — 99214 OFFICE O/P EST MOD 30 MIN: CPT

## 2023-09-27 ENCOUNTER — APPOINTMENT (OUTPATIENT)
Dept: NEUROSURGERY | Facility: CLINIC | Age: 78
End: 2023-09-27
Payer: COMMERCIAL

## 2023-09-27 VITALS
HEIGHT: 70 IN | HEART RATE: 69 BPM | SYSTOLIC BLOOD PRESSURE: 152 MMHG | DIASTOLIC BLOOD PRESSURE: 76 MMHG | BODY MASS INDEX: 24.34 KG/M2 | OXYGEN SATURATION: 95 % | WEIGHT: 170 LBS

## 2023-09-27 DIAGNOSIS — M21.371 FOOT DROP, RIGHT FOOT: ICD-10-CM

## 2023-09-27 DIAGNOSIS — M48.061 SPINAL STENOSIS, LUMBAR REGION WITHOUT NEUROGENIC CLAUDICATION: ICD-10-CM

## 2023-09-27 DIAGNOSIS — R29.898 OTHER SYMPTOMS AND SIGNS INVOLVING THE MUSCULOSKELETAL SYSTEM: ICD-10-CM

## 2023-09-27 PROCEDURE — 99213 OFFICE O/P EST LOW 20 MIN: CPT

## 2023-09-27 RX ORDER — MELOXICAM 15 MG/1
15 TABLET ORAL DAILY
Qty: 30 | Refills: 1 | Status: DISCONTINUED | COMMUNITY
Start: 2023-06-26 | End: 2023-09-27

## 2023-09-27 RX ORDER — METHYLPREDNISOLONE 4 MG/1
4 TABLET ORAL
Qty: 1 | Refills: 0 | Status: DISCONTINUED | COMMUNITY
Start: 2023-06-05 | End: 2023-09-27

## 2023-09-28 PROBLEM — M21.371 RIGHT FOOT DROP: Status: ACTIVE | Noted: 2023-09-28

## 2023-09-28 PROBLEM — R29.898 WEAKNESS OF RIGHT FOOT: Status: ACTIVE | Noted: 2023-09-28

## 2023-10-10 ENCOUNTER — APPOINTMENT (OUTPATIENT)
Dept: PAIN MANAGEMENT | Facility: CLINIC | Age: 78
End: 2023-10-10
Payer: COMMERCIAL

## 2023-10-10 DIAGNOSIS — M54.17 RADICULOPATHY, LUMBOSACRAL REGION: ICD-10-CM

## 2023-10-10 PROCEDURE — 62323 NJX INTERLAMINAR LMBR/SAC: CPT

## 2023-10-24 ENCOUNTER — APPOINTMENT (OUTPATIENT)
Dept: PAIN MANAGEMENT | Facility: CLINIC | Age: 78
End: 2023-10-24

## 2023-10-26 RX ORDER — OXYCODONE AND ACETAMINOPHEN 5; 325 MG/1; MG/1
5-325 TABLET ORAL
Qty: 28 | Refills: 0 | Status: ACTIVE | COMMUNITY
Start: 2023-10-26 | End: 1900-01-01

## 2024-01-24 ENCOUNTER — APPOINTMENT (OUTPATIENT)
Dept: NEUROSURGERY | Facility: CLINIC | Age: 79
End: 2024-01-24

## 2024-07-03 ENCOUNTER — APPOINTMENT (OUTPATIENT)
Dept: MRI IMAGING | Facility: IMAGING CENTER | Age: 79
End: 2024-07-03

## 2024-07-03 ENCOUNTER — APPOINTMENT (OUTPATIENT)
Dept: NEUROSURGERY | Facility: CLINIC | Age: 79
End: 2024-07-03

## 2024-07-31 ENCOUNTER — OFFICE (OUTPATIENT)
Dept: URBAN - METROPOLITAN AREA CLINIC 109 | Facility: CLINIC | Age: 79
Setting detail: OPHTHALMOLOGY
End: 2024-07-31
Payer: COMMERCIAL

## 2024-07-31 VITALS — HEIGHT: 60 IN

## 2024-07-31 DIAGNOSIS — H26.491: ICD-10-CM

## 2024-07-31 DIAGNOSIS — H35.3131: ICD-10-CM

## 2024-07-31 PROCEDURE — 92014 COMPRE OPH EXAM EST PT 1/>: CPT | Performed by: OPHTHALMOLOGY

## 2024-07-31 PROCEDURE — 92134 CPTRZ OPH DX IMG PST SGM RTA: CPT | Performed by: OPHTHALMOLOGY

## 2024-07-31 ASSESSMENT — CONFRONTATIONAL VISUAL FIELD TEST (CVF)
OD_FINDINGS: FULL
OS_FINDINGS: FULL

## 2024-08-13 ENCOUNTER — NON-APPOINTMENT (OUTPATIENT)
Age: 79
End: 2024-08-13

## 2024-08-20 ENCOUNTER — NON-APPOINTMENT (OUTPATIENT)
Age: 79
End: 2024-08-20

## 2024-08-21 ENCOUNTER — APPOINTMENT (OUTPATIENT)
Dept: NEUROSURGERY | Facility: CLINIC | Age: 79
End: 2024-08-21
Payer: COMMERCIAL

## 2024-08-21 ENCOUNTER — APPOINTMENT (OUTPATIENT)
Dept: MRI IMAGING | Facility: IMAGING CENTER | Age: 79
End: 2024-08-21
Payer: COMMERCIAL

## 2024-08-21 ENCOUNTER — OUTPATIENT (OUTPATIENT)
Dept: OUTPATIENT SERVICES | Facility: HOSPITAL | Age: 79
LOS: 1 days | End: 2024-08-21
Payer: COMMERCIAL

## 2024-08-21 VITALS
DIASTOLIC BLOOD PRESSURE: 81 MMHG | HEIGHT: 70 IN | TEMPERATURE: 98.1 F | SYSTOLIC BLOOD PRESSURE: 158 MMHG | OXYGEN SATURATION: 95 % | WEIGHT: 180 LBS | RESPIRATION RATE: 16 BRPM | BODY MASS INDEX: 25.77 KG/M2 | HEART RATE: 65 BPM

## 2024-08-21 DIAGNOSIS — Z98.890 OTHER SPECIFIED POSTPROCEDURAL STATES: Chronic | ICD-10-CM

## 2024-08-21 DIAGNOSIS — G93.89 OTHER SPECIFIED DISORDERS OF BRAIN: ICD-10-CM

## 2024-08-21 PROCEDURE — 70551 MRI BRAIN STEM W/O DYE: CPT | Mod: 26

## 2024-08-21 PROCEDURE — 70551 MRI BRAIN STEM W/O DYE: CPT

## 2024-08-21 PROCEDURE — 99212 OFFICE O/P EST SF 10 MIN: CPT

## 2024-08-21 RX ORDER — LOSARTAN POTASSIUM 100 MG/1
TABLET, FILM COATED ORAL
Refills: 0 | Status: ACTIVE | COMMUNITY

## 2024-08-21 RX ORDER — NIFEDIPINE 20 MG/1
CAPSULE ORAL
Refills: 0 | Status: ACTIVE | COMMUNITY

## 2024-08-21 NOTE — PHYSICAL EXAM
[General Appearance - Alert] : alert [General Appearance - In No Acute Distress] : in no acute distress [General Appearance - Well Nourished] : well nourished [Oriented To Time, Place, And Person] : oriented to person, place, and time [Person] : oriented to person [Place] : oriented to place [Time] : oriented to time [Short Term Intact] : short term memory intact [Remote Intact] : remote memory intact [Registration Intact] : recent registration memory intact [Motor Strength] : muscle strength was normal in all four extremities [Romberg's Sign] : a positive Romberg's sign was present [2+] : Ankle jerk left 2+ [Sclera] : the sclera and conjunctiva were normal [PERRL With Normal Accommodation] : pupils were equal in size, round, reactive to light, with normal accommodation [Extraocular Movements] : extraocular movements were intact [Outer Ear] : the ears and nose were normal in appearance [] : no respiratory distress [Motor Tone] : muscle strength and tone were normal [Involuntary Movements] : no involuntary movements were seen [Skin Color & Pigmentation] : normal skin color and pigmentation

## 2024-08-21 NOTE — REVIEW OF SYSTEMS
[Poor Coordination] : poor coordination [Difficulty Walking] : difficulty walking [As Noted in HPI] : as noted in HPI [Negative] : Eyes [de-identified] : uses walker for long distances

## 2024-08-21 NOTE — REASON FOR VISIT
[Follow-Up: _____] : a [unfilled] follow-up visit [FreeTextEntry1] : 78-year-old right-handed male with a past medical history of HTN, HLD, and osteoarthritis reports balance impairment seen by orthopedists Huang Gaytan cervical, thoracic, and lumbar spine MRI ordered. 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 A brain MRI w/wo contrast was ordered. Today he is here to discuss the findings.  He reports continued balance difficulties when standing, denies falls, denies visual impairment, weakness of extremities, and altered sensation.

## 2024-08-21 NOTE — END OF VISIT
Post-Op Assessment Note    CV Status:  Stable    Pain management: adequate     Mental Status:  Sleepy   Hydration Status:  Euvolemic   PONV Controlled:  Controlled   Airway Patency:  Patent      Post Op Vitals Reviewed: Yes      Staff: CRNA         No complications documented      /56 (09/02/22 1554)    Temp 97 9 °F (36 6 °C) (09/02/22 1554)    Pulse 81 (09/02/22 1554)   Resp 18 (09/02/22 1554)    SpO2 97 % (09/02/22 1554)
[Time Spent: ___ minutes] : I have spent [unfilled] minutes of time on the encounter which excludes teaching and separately reported services.

## 2024-08-21 NOTE — REVIEW OF SYSTEMS
[Poor Coordination] : poor coordination [Difficulty Walking] : difficulty walking [As Noted in HPI] : as noted in HPI [Negative] : Eyes [de-identified] : uses walker for long distances

## 2024-08-21 NOTE — REVIEW OF SYSTEMS
[Poor Coordination] : poor coordination [Difficulty Walking] : difficulty walking [As Noted in HPI] : as noted in HPI [Negative] : Eyes [de-identified] : uses walker for long distances

## 2024-08-21 NOTE — ASSESSMENT
[FreeTextEntry1] : Discussion MRI looks good Repeat MRI w/o contrast in one year, after year 3 of surveillance will extend interval to 18 months Surgical intervention is unlikely Contact office with any questions or concerns.   .IAxel evaluated the patient with the nurse practitioner Joann Barthelemy and established the plan of care. I personally discuss this patient with the nurse practitioner at the time of the visit. I agree with the assessment and plan as written, unless noted below.

## 2025-08-20 ENCOUNTER — APPOINTMENT (OUTPATIENT)
Dept: MRI IMAGING | Facility: IMAGING CENTER | Age: 80
End: 2025-08-20

## 2025-08-20 ENCOUNTER — APPOINTMENT (OUTPATIENT)
Dept: NEUROSURGERY | Facility: CLINIC | Age: 80
End: 2025-08-20